# Patient Record
Sex: FEMALE | Race: BLACK OR AFRICAN AMERICAN | NOT HISPANIC OR LATINO | Employment: FULL TIME | URBAN - METROPOLITAN AREA
[De-identification: names, ages, dates, MRNs, and addresses within clinical notes are randomized per-mention and may not be internally consistent; named-entity substitution may affect disease eponyms.]

---

## 2014-03-24 LAB — HM PAP SMEAR: NEGATIVE

## 2017-08-02 ENCOUNTER — OFFICE VISIT (OUTPATIENT)
Dept: OBSTETRICS AND GYNECOLOGY | Facility: CLINIC | Age: 32
End: 2017-08-02

## 2017-08-02 VITALS
DIASTOLIC BLOOD PRESSURE: 60 MMHG | SYSTOLIC BLOOD PRESSURE: 94 MMHG | WEIGHT: 229 LBS | HEIGHT: 71 IN | BODY MASS INDEX: 32.06 KG/M2

## 2017-08-02 DIAGNOSIS — N94.6 DYSMENORRHEA: Primary | ICD-10-CM

## 2017-08-02 DIAGNOSIS — Z13.9 SCREENING: ICD-10-CM

## 2017-08-02 DIAGNOSIS — Z01.419 WELL FEMALE EXAM WITH ROUTINE GYNECOLOGICAL EXAM: ICD-10-CM

## 2017-08-02 LAB
BILIRUB BLD-MCNC: NEGATIVE MG/DL
CLARITY, POC: CLEAR
COLOR UR: YELLOW
GLUCOSE UR STRIP-MCNC: NEGATIVE MG/DL
KETONES UR QL: NEGATIVE
LEUKOCYTE EST, POC: ABNORMAL
NITRITE UR-MCNC: NEGATIVE MG/ML
PH UR: 7 [PH] (ref 5–8)
PROT UR STRIP-MCNC: NEGATIVE MG/DL
RBC # UR STRIP: ABNORMAL /UL
SP GR UR: 1.02 (ref 1–1.03)
UROBILINOGEN UR QL: NORMAL

## 2017-08-02 PROCEDURE — 99213 OFFICE O/P EST LOW 20 MIN: CPT | Performed by: OBSTETRICS & GYNECOLOGY

## 2017-08-02 RX ORDER — NORETHINDRONE ACETATE AND ETHINYL ESTRADIOL 1MG-20(21)
1 KIT ORAL DAILY
Qty: 28 TABLET | Refills: 12 | Status: SHIPPED | OUTPATIENT
Start: 2017-08-02 | End: 2018-08-02

## 2017-08-02 NOTE — PROGRESS NOTES
[facility]     2017      Patient:  Benita Jaquez   MR#:[id number]    Office note    CC: lower abdominal pain     Subjective     History of Present Illness  32 y.o. female  with complaint of intermittent moderate to severe lower abdominal pain crampy in nature for the last 6 months worse in the last 2 months.  GYN ultrasound reportedly completed at [facility] significant for fibroid and ovarian cysts.  Report is been requested      Patient Active Problem List   Diagnosis   • Well female exam with routine gynecological exam   • Dysmenorrhea       Past Medical History:   Diagnosis Date   • Abnormal Pap smear of cervix    • Asthma    • Diabetes mellitus     GESTATIONAL   • LGSIL on Pap smear of cervix 2005   • Trichomonas infection 2010       Past Surgical History:   Procedure Laterality Date   •  SECTION     •  SECTION     •  SECTION     •  SECTION     • DILATATION AND CURETTAGE  2008   • HYSTEROTOMY  2008    closure of hysterotomy   • MINI-LAPAROTOMY  2008   • OTHER SURGICAL HISTORY  2008    reduction of bowel hernia       Obstetric History:  OB History      Para Term  AB TAB SAB Ectopic Multiple Living    8 4 4  4  1   4         Menstrual History:     Patient's last menstrual period was 2017 (exact date).       #: 1, Date: 2005, Sex: Female, Weight: 6 lb 11 oz (3.033 kg), GA: 39w0d, Delivery: , Low Transverse, Apgar1: None, Apgar5: None, Living: Yes, Birth Comments: None    #: 2, Date: 2006, Sex: Male, Weight: 7 lb 12 oz (3.515 kg), GA: 39w0d, Delivery: , Low Transverse, Apgar1: None, Apgar5: None, Living: Yes, Birth Comments: None    #: 3, Date: 2008, Sex: None, Weight: None, GA: None, Delivery: None, Apgar1: None, Apgar5: None, Living: None, Birth Comments: None    #: 4, Date: 04/06/10, Sex: Male, Weight: 7 lb 10 oz (3.459 kg), GA:  39w0d, Delivery: , Low Transverse, Apgar1: 9, Apgar5: 9, Living: Yes, Birth Comments: None    #: 5, Date: 2010, Sex: None, Weight: None, GA: None, Delivery: None, Apgar1: None, Apgar5: None, Living: None, Birth Comments: None    #: 6, Date: 2010, Sex: None, Weight: None, GA: None, Delivery: None, Apgar1: None, Apgar5: None, Living: None, Birth Comments: None    #: 7, Date: 2011, Sex: None, Weight: None, GA: None, Delivery: None, Apgar1: None, Apgar5: None, Living: None, Birth Comments: None    #: 8, Date: 2015, Sex: Male, Weight: 8 lb 7 oz (3.827 kg), GA: 38w0d, Delivery: , Low Transverse, Apgar1: None, Apgar5: None, Living: Yes, Birth Comments: None      Family History   Problem Relation Age of Onset   • Breast cancer Maternal Grandmother 64   • Diabetes Maternal Grandmother    • Breast cancer Other 70   • Diabetes Maternal Aunt    • Thyroid cancer Maternal Aunt 57   • Diabetes Maternal Uncle        Social History   Substance Use Topics   • Smoking status: Current Some Day Smoker     Types: Cigarettes   • Smokeless tobacco: Never Used   • Alcohol use 0.6 oz/week     1 Glasses of wine per week       Review of patient's allergies indicates no known allergies.    No current outpatient prescriptions on file.    The following portions of the patient's history were reviewed and updated as appropriate: allergies, current medications, past family history, past medical history, past social history, past surgical history and problem list.    Review of Systems   Constitutional: Negative.    Respiratory: Negative.    Cardiovascular: Negative.    Gastrointestinal: Positive for abdominal pain.   Genitourinary: Positive for dyspareunia, menstrual problem and pelvic pain.   Psychiatric/Behavioral: Negative.        BP Readings from Last 3 Encounters:   17 94/60      Wt Readings from Last 3 Encounters:   17 229 lb (104 kg)      BMI: Estimated body mass index is 31.94 kg/(m^2) as calculated  "from the following:    Height as of this encounter: 71\" (180.3 cm).    Weight as of this encounter: 229 lb (104 kg).  BSA: Estimated body surface area is 2.23 meters squared as calculated from the following:    Height as of this encounter: 71\" (180.3 cm).    Weight as of this encounter: 229 lb (104 kg).    Objective   Physical Exam   Constitutional: She is oriented to person, place, and time. She appears well-developed and well-nourished.   Cardiovascular: Normal rate and regular rhythm.    Pulmonary/Chest: Effort normal and breath sounds normal.   Abdominal: Soft. Bowel sounds are normal. She exhibits no distension and no mass. There is no tenderness.   Genitourinary: Vagina normal and uterus normal.   Genitourinary Comments: Mobile uterus mildly tender on exam   Neurological: She is alert and oriented to person, place, and time.   Psychiatric: She has a normal mood and affect. Her behavior is normal. Judgment and thought content normal.   Nursing note and vitals reviewed.        Assessment/Plan     1.  Pelvic pain  2.  Dysmenorrhea  3.  History of  section ×4    Plan.  Trial of oral contraceptive pills for 3 months and reevaluate  Return in about 3 months (around 2017) for GYN follow-up.   Request PAP and cultures from [facility]         Khurram Cisneros MD   2017 10:02 AM  "

## 2017-08-04 ENCOUNTER — TELEPHONE (OUTPATIENT)
Dept: OBSTETRICS AND GYNECOLOGY | Facility: CLINIC | Age: 32
End: 2017-08-04

## 2017-08-04 PROBLEM — A59.01 TRICHOMONAL VAGINITIS: Status: ACTIVE | Noted: 2017-08-04

## 2017-08-04 LAB
C TRACH RRNA SPEC QL NAA+PROBE: NEGATIVE
N GONORRHOEA RRNA SPEC QL NAA+PROBE: NEGATIVE
T VAGINALIS RRNA SPEC QL NAA+PROBE: POSITIVE

## 2017-08-04 NOTE — PROGRESS NOTES
Call patient: STD screening returns positive for Trichomonas    Scheduled consult for treatment and instructions

## 2017-08-04 NOTE — TELEPHONE ENCOUNTER
----- Message from Khurram Cisneros MD sent at 8/4/2017  7:57 AM EDT -----  Call patient: STD screening returns positive for Trichomonas    Scheduled consult for treatment and instructions

## 2017-08-09 ENCOUNTER — OFFICE VISIT (OUTPATIENT)
Dept: OBSTETRICS AND GYNECOLOGY | Facility: CLINIC | Age: 32
End: 2017-08-09

## 2017-08-09 VITALS
WEIGHT: 229 LBS | DIASTOLIC BLOOD PRESSURE: 70 MMHG | HEIGHT: 71 IN | BODY MASS INDEX: 32.06 KG/M2 | SYSTOLIC BLOOD PRESSURE: 120 MMHG

## 2017-08-09 DIAGNOSIS — A59.01 TRICHOMONAL VAGINITIS: Primary | ICD-10-CM

## 2017-08-09 PROCEDURE — 99213 OFFICE O/P EST LOW 20 MIN: CPT | Performed by: OBSTETRICS & GYNECOLOGY

## 2017-08-09 RX ORDER — METRONIDAZOLE 500 MG/1
2000 TABLET ORAL WEEKLY
Qty: 8 TABLET | Refills: 0 | Status: SHIPPED | OUTPATIENT
Start: 2017-08-09 | End: 2017-08-17

## 2017-08-09 NOTE — PROGRESS NOTES
[facility]     2017      Patient:  Benita Jaquez   MR#:[id number]    Office note    CC: + trich    Subjective     History of Present Illness  32 y.o. female  returns for treatment of Trichomonas vaginalis.  The patient's asked multiple questions about routes of acquiring the infection.  Both members the couple report being monogamous.  Discussed transmission of disease.    Patient reports feeling well with minimal symptoms from the disease.  She's noticed only a very mild discharge with intermittent for the past 2 weeks with no odor    Patient Active Problem List   Diagnosis   • Well female exam with routine gynecological exam   • Dysmenorrhea   • Trichomonal vaginitis       Past Medical History:   Diagnosis Date   • Abnormal Pap smear of cervix    • Asthma    • Diabetes mellitus     GESTATIONAL   • LGSIL on Pap smear of cervix 2005   • Trichomonas infection 2010   • Urogenital trichomoniasis [id number]       Past Surgical History:   Procedure Laterality Date   •  SECTION     •  SECTION     •  SECTION     •  SECTION     • DILATATION AND CURETTAGE  2008   • HYSTEROTOMY  2008    closure of hysterotomy   • MINI-LAPAROTOMY  2008   • OTHER SURGICAL HISTORY  2008    reduction of bowel hernia       Obstetric History:  OB History      Para Term  AB TAB SAB Ectopic Multiple Living    8 4 4  4  1   4         Menstrual History:     Patient's last menstrual period was 2017 (exact date).       #: 1, Date: 2005, Sex: Female, Weight: 6 lb 11 oz (3.033 kg), GA: 39w0d, Delivery: , Low Transverse, Apgar1: None, Apgar5: None, Living: Yes, Birth Comments: None    #: 2, Date: 2006, Sex: Male, Weight: 7 lb 12 oz (3.515 kg), GA: 39w0d, Delivery: , Low Transverse, Apgar1: None, Apgar5: None, Living: Yes, Birth Comments: None    #: 3, Date: 2008, Sex: None, Weight: None, GA: None,  Delivery: None, Apgar1: None, Apgar5: None, Living: None, Birth Comments: None    #: 4, Date: 04/06/10, Sex: Male, Weight: 7 lb 10 oz (3.459 kg), GA: 39w0d, Delivery: , Low Transverse, Apgar1: 9, Apgar5: 9, Living: Yes, Birth Comments: None    #: 5, Date: 2010, Sex: None, Weight: None, GA: None, Delivery: None, Apgar1: None, Apgar5: None, Living: None, Birth Comments: None    #: 6, Date: 2010, Sex: None, Weight: None, GA: None, Delivery: None, Apgar1: None, Apgar5: None, Living: None, Birth Comments: None    #: 7, Date: 2011, Sex: None, Weight: None, GA: None, Delivery: None, Apgar1: None, Apgar5: None, Living: None, Birth Comments: None    #: 8, Date: 2015, Sex: Male, Weight: 8 lb 7 oz (3.827 kg), GA: 38w0d, Delivery: , Low Transverse, Apgar1: None, Apgar5: None, Living: Yes, Birth Comments: None      Family History   Problem Relation Age of Onset   • Breast cancer Maternal Grandmother 64   • Diabetes Maternal Grandmother    • Breast cancer Other 70   • Diabetes Maternal Aunt    • Thyroid cancer Maternal Aunt 57   • Diabetes Maternal Uncle        Social History   Substance Use Topics   • Smoking status: Current Some Day Smoker     Types: Cigarettes   • Smokeless tobacco: Never Used   • Alcohol use 0.6 oz/week     1 Glasses of wine per week       Review of patient's allergies indicates no known allergies.      Current Outpatient Prescriptions:   •  norethindrone-ethinyl estradiol FE (MICROGESTIN FE ) 1-20 MG-MCG per tablet, Take 1 tablet by mouth Daily., Disp: 28 tablet, Rfl: 12    The following portions of the patient's history were reviewed and updated as appropriate: allergies, current medications, past family history, past medical history, past social history, past surgical history and problem list.    Review of Systems   Constitutional: Negative.    Respiratory: Negative.    Cardiovascular: Negative.    Gastrointestinal: Negative.    Genitourinary: Positive for vaginal  "discharge.   Psychiatric/Behavioral: Negative.        BP Readings from Last 3 Encounters:   08/09/17 120/70   08/02/17 94/60      Wt Readings from Last 3 Encounters:   08/09/17 229 lb (104 kg)   08/02/17 229 lb (104 kg)      BMI: Estimated body mass index is 31.94 kg/(m^2) as calculated from the following:    Height as of this encounter: 71\" (180.3 cm).    Weight as of this encounter: 229 lb (104 kg).  BSA: Estimated body surface area is 2.23 meters squared as calculated from the following:    Height as of this encounter: 71\" (180.3 cm).    Weight as of this encounter: 229 lb (104 kg).    Objective   Physical Exam   Constitutional: She is oriented to person, place, and time. She appears well-developed and well-nourished.   Cardiovascular: Normal rate and regular rhythm.    Pulmonary/Chest: Effort normal and breath sounds normal.   Abdominal: Soft. Bowel sounds are normal. She exhibits no distension and no mass. There is no tenderness.   Neurological: She is alert and oriented to person, place, and time.   Psychiatric: She has a normal mood and affect. Her behavior is normal. Judgment and thought content normal.   Nursing note and vitals reviewed.        Assessment/Plan     1.  Trichomonas vaginalis-prescription provided for patient's  Hermes Rausch and prescription sent to the patient's pharmacy    Detailed instructions provided regarding taking the medication and abstaining from sex for 2 weeks    Return in about 6 weeks (around 9/20/2017) for DAYO Trich .        Khurram Cisneros MD   8/9/2017 8:15 AM  "

## 2017-09-22 ENCOUNTER — OFFICE VISIT (OUTPATIENT)
Dept: OBSTETRICS AND GYNECOLOGY | Facility: CLINIC | Age: 32
End: 2017-09-22

## 2017-09-22 VITALS
BODY MASS INDEX: 32.48 KG/M2 | SYSTOLIC BLOOD PRESSURE: 118 MMHG | WEIGHT: 232 LBS | HEIGHT: 71 IN | DIASTOLIC BLOOD PRESSURE: 80 MMHG

## 2017-09-22 DIAGNOSIS — A59.01 TRICHOMONAL VAGINITIS: Primary | ICD-10-CM

## 2017-09-22 PROCEDURE — 99212 OFFICE O/P EST SF 10 MIN: CPT | Performed by: OBSTETRICS & GYNECOLOGY

## 2017-09-22 NOTE — PROGRESS NOTES
[facility]     2017      Patient:  Benita Jaquez   MR#:[id number]    Office note    CC: Trich DAYO    Subjective     History of Present Illness  32 y.o. female  the patient returns feeling well without any complaints for Trichomonas test of cure.  The patient reports no symptoms or vaginal discharge and reports having completed previous treatment regimen as did her partner.      Patient Active Problem List   Diagnosis   • Well female exam with routine gynecological exam   • Dysmenorrhea   • Trichomonal vaginitis       Past Medical History:   Diagnosis Date   • Abnormal Pap smear of cervix    • Asthma    • Diabetes mellitus     GESTATIONAL   • LGSIL on Pap smear of cervix 2005   • Trichomonas infection 2010   • Urogenital trichomoniasis [id number]       Past Surgical History:   Procedure Laterality Date   •  SECTION     •  SECTION     •  SECTION     •  SECTION     • DILATATION AND CURETTAGE  2008   • HYSTEROTOMY  2008    closure of hysterotomy   • MINI-LAPAROTOMY  2008   • OTHER SURGICAL HISTORY  2008    reduction of bowel hernia       Obstetric History:  OB History      Para Term  AB Living    8 4 4  4 4    SAB TAB Ectopic Multiple Live Births    1    4         Menstrual History:     Patient's last menstrual period was 2017.       #: 1, Date: 2005, Sex: Female, Weight: 6 lb 11 oz (3.033 kg), GA: 39w0d, Delivery: , Low Transverse, Apgar1: None, Apgar5: None, Living: Living, Birth Comments: None    #: 2, Date: 2006, Sex: Male, Weight: 7 lb 12 oz (3.515 kg), GA: 39w0d, Delivery: , Low Transverse, Apgar1: None, Apgar5: None, Living: Living, Birth Comments: None    #: 3, Date: 2008, Sex: None, Weight: None, GA: None, Delivery: None, Apgar1: None, Apgar5: None, Living: None, Birth Comments: None    #: 4, Date: 04/06/10, Sex: Male, Weight: 7 lb 10 oz (3.459 kg),  GA: 39w0d, Delivery: , Low Transverse, Apgar1: 9, Apgar5: 9, Living: Living, Birth Comments: None    #: 5, Date: 2010, Sex: None, Weight: None, GA: None, Delivery: None, Apgar1: None, Apgar5: None, Living: None, Birth Comments: None    #: 6, Date: 2010, Sex: None, Weight: None, GA: None, Delivery: None, Apgar1: None, Apgar5: None, Living: None, Birth Comments: None    #: 7, Date: 2011, Sex: None, Weight: None, GA: None, Delivery: None, Apgar1: None, Apgar5: None, Living: None, Birth Comments: None    #: 8, Date: 2015, Sex: Male, Weight: 8 lb 7 oz (3.827 kg), GA: 38w0d, Delivery: , Low Transverse, Apgar1: None, Apgar5: None, Living: Living, Birth Comments: None      Family History   Problem Relation Age of Onset   • Breast cancer Maternal Grandmother 64   • Diabetes Maternal Grandmother    • Breast cancer Other 70   • Diabetes Maternal Aunt    • Thyroid cancer Maternal Aunt 57   • Diabetes Maternal Uncle        Social History   Substance Use Topics   • Smoking status: Current Some Day Smoker     Types: Cigarettes   • Smokeless tobacco: Never Used   • Alcohol use 0.6 oz/week     1 Glasses of wine per week       Review of patient's allergies indicates no known allergies.      Current Outpatient Prescriptions:   •  norethindrone-ethinyl estradiol FE (MICROGESTIN FE ) 1-20 MG-MCG per tablet, Take 1 tablet by mouth Daily., Disp: 28 tablet, Rfl: 12    The following portions of the patient's history were reviewed and updated as appropriate: allergies, current medications, past family history, past medical history, past social history, past surgical history and problem list.    Review of Systems   Constitutional: Negative.    Respiratory: Negative.    Cardiovascular: Negative.    Gastrointestinal: Negative.    Genitourinary: Negative.  Negative for vaginal discharge.   Psychiatric/Behavioral: Negative.        BP Readings from Last 3 Encounters:   17 118/80   17 120/70   17  "94/60      Wt Readings from Last 3 Encounters:   09/22/17 232 lb (105 kg)   08/09/17 229 lb (104 kg)   08/02/17 229 lb (104 kg)      BMI: Estimated body mass index is 32.36 kg/(m^2) as calculated from the following:    Height as of this encounter: 71\" (180.3 cm).    Weight as of this encounter: 232 lb (105 kg).  BSA: Estimated body surface area is 2.24 meters squared as calculated from the following:    Height as of this encounter: 71\" (180.3 cm).    Weight as of this encounter: 232 lb (105 kg).    Objective   Physical Exam   Constitutional: She is oriented to person, place, and time. She appears well-developed and well-nourished.   Abdominal: Soft. Bowel sounds are normal. She exhibits no distension and no mass. There is no tenderness.   Genitourinary: Vagina normal and uterus normal.   Genitourinary Comments: No odor     Neurological: She is alert and oriented to person, place, and time.   Psychiatric: She has a normal mood and affect. Her behavior is normal. Judgment and thought content normal.   Nursing note and vitals reviewed.        Assessment/Plan     Benita was seen today for follow-up.    Diagnoses and all orders for this visit:    Trichomonal vaginitis  -     Chlamydia trachomatis, Neisseria gonorrhoeae, Trichomonas vaginalis, PCR          No Follow-up on file.        Khurram Cisneros MD   9/22/2017 11:58 AM  "

## 2017-09-27 ENCOUNTER — OFFICE VISIT (OUTPATIENT)
Dept: OBSTETRICS AND GYNECOLOGY | Facility: CLINIC | Age: 32
End: 2017-09-27

## 2017-09-27 ENCOUNTER — TELEPHONE (OUTPATIENT)
Dept: OBSTETRICS AND GYNECOLOGY | Facility: CLINIC | Age: 32
End: 2017-09-27

## 2017-09-27 DIAGNOSIS — A59.01 TRICHOMONAL VAGINITIS: Primary | ICD-10-CM

## 2017-09-27 PROCEDURE — 99213 OFFICE O/P EST LOW 20 MIN: CPT | Performed by: NURSE PRACTITIONER

## 2017-09-27 RX ORDER — METRONIDAZOLE 500 MG/1
TABLET ORAL
Qty: 4 TABLET | Refills: 0 | Status: SHIPPED | OUTPATIENT
Start: 2017-09-27

## 2017-09-27 NOTE — TELEPHONE ENCOUNTER
Do you want to treat Benita since this was a test of cure or ?  Should she see Dionna . Your not here until 2 wks

## 2017-09-27 NOTE — TELEPHONE ENCOUNTER
----- Message from Khurram Cisneros MD sent at 9/26/2017  4:36 PM EDT -----  Call patient: STD screening returns positive for Trichomonas    Scheduled consult for treatment and instructions

## 2017-09-27 NOTE — PROGRESS NOTES
Subjective   Benita Jaquez is a 32 y.o. female presents for f/u trichomoniasis.     History of Present Illness    Benita was found to have trichomoniasis early August. She reported back to office for counseling and both she and partner were treated. Benita reports both completed the flagyl therapy. She presented for DAYO last week. Trich again positive. Here today for further counseling and treatment. She is asymptomatic. She reports she and her partner had IC x 1 but did use a condom.    The following portions of the patient's history were reviewed and updated as appropriate: allergies, current medications, past family history, past medical history, past social history, past surgical history and problem list.    Review of Systems   Constitutional: Negative for chills, fatigue and fever.   Gastrointestinal: Negative for abdominal distention and abdominal pain.   Genitourinary: Negative for difficulty urinating, dyspareunia, dysuria, frequency, genital sores, menstrual problem, pelvic pain, vaginal bleeding, vaginal discharge and vaginal pain.       Objective   Physical Exam    No exam today     Assessment/Plan   Benita was seen today for gynecologic exam.    Diagnoses and all orders for this visit:    Trichomonal vaginitis    Other orders  -     metroNIDAZOLE (FLAGYL) 500 MG tablet; Take 4 tablets now. Avoid alcohol during and 24 hours following therapy.      Another round of flagyl sent to pharmacy. Advised Benita's  to be evaluated at [facility] department and treated again. Highly encouraged her to abstain from IC until both she and partner have negative test of cures. She verbalizes understanding and agrees with plan.    HEIKE Giordano

## 2022-08-03 ENCOUNTER — HOSPITAL ENCOUNTER (EMERGENCY)
Facility: HOSPITAL | Age: 37
Discharge: HOME | End: 2022-08-03
Attending: EMERGENCY MEDICINE | Admitting: EMERGENCY MEDICINE

## 2022-08-03 ENCOUNTER — APPOINTMENT (OUTPATIENT)
Dept: ULTRASOUND IMAGING | Facility: HOSPITAL | Age: 37
End: 2022-08-03

## 2022-08-03 VITALS
OXYGEN SATURATION: 100 % | RESPIRATION RATE: 18 BRPM | TEMPERATURE: 97.8 F | SYSTOLIC BLOOD PRESSURE: 121 MMHG | HEART RATE: 60 BPM | DIASTOLIC BLOOD PRESSURE: 81 MMHG

## 2022-08-03 DIAGNOSIS — N93.8 DYSFUNCTIONAL UTERINE BLEEDING: Primary | ICD-10-CM

## 2022-08-03 LAB
ALBUMIN SERPL-MCNC: 4.3 G/DL (ref 3.5–5.2)
ALBUMIN/GLOB SERPL: 1.5 G/DL
ALP SERPL-CCNC: 50 U/L (ref 39–117)
ALT SERPL W P-5'-P-CCNC: 12 U/L (ref 1–33)
ANION GAP SERPL CALCULATED.3IONS-SCNC: 12.1 MMOL/L (ref 5–15)
AST SERPL-CCNC: 12 U/L (ref 1–32)
BACTERIA UR QL AUTO: ABNORMAL /HPF
BASOPHILS # BLD AUTO: 0.04 10*3/MM3 (ref 0–0.2)
BASOPHILS NFR BLD AUTO: 0.8 % (ref 0–1.5)
BILIRUB SERPL-MCNC: 0.2 MG/DL (ref 0–1.2)
BILIRUB UR QL STRIP: NEGATIVE
BUN SERPL-MCNC: 8 MG/DL (ref 6–20)
BUN/CREAT SERPL: 9.6 (ref 7–25)
CALCIUM SPEC-SCNC: 8.9 MG/DL (ref 8.6–10.5)
CHLORIDE SERPL-SCNC: 104 MMOL/L (ref 98–107)
CLARITY UR: ABNORMAL
CLUE CELLS SPEC QL WET PREP: NORMAL
CO2 SERPL-SCNC: 22.9 MMOL/L (ref 22–29)
COLOR UR: ABNORMAL
CREAT SERPL-MCNC: 0.83 MG/DL (ref 0.57–1)
DEPRECATED RDW RBC AUTO: 42.3 FL (ref 37–54)
EGFRCR SERPLBLD CKD-EPI 2021: 92.7 ML/MIN/1.73
EOSINOPHIL # BLD AUTO: 0.16 10*3/MM3 (ref 0–0.4)
EOSINOPHIL NFR BLD AUTO: 3.1 % (ref 0.3–6.2)
ERYTHROCYTE [DISTWIDTH] IN BLOOD BY AUTOMATED COUNT: 13.2 % (ref 12.3–15.4)
GLOBULIN UR ELPH-MCNC: 2.9 GM/DL
GLUCOSE SERPL-MCNC: 91 MG/DL (ref 65–99)
GLUCOSE UR STRIP-MCNC: NEGATIVE MG/DL
HCG SERPL QL: NEGATIVE
HCT VFR BLD AUTO: 36.4 % (ref 34–46.6)
HGB BLD-MCNC: 11.9 G/DL (ref 12–15.9)
HGB UR QL STRIP.AUTO: ABNORMAL
HYALINE CASTS UR QL AUTO: ABNORMAL /LPF
HYDATID CYST SPEC WET PREP: NORMAL
IMM GRANULOCYTES # BLD AUTO: 0.01 10*3/MM3 (ref 0–0.05)
IMM GRANULOCYTES NFR BLD AUTO: 0.2 % (ref 0–0.5)
KETONES UR QL STRIP: NEGATIVE
KOH PREP NAIL: NORMAL
LEUKOCYTE ESTERASE UR QL STRIP.AUTO: ABNORMAL
LYMPHOCYTES # BLD AUTO: 1.55 10*3/MM3 (ref 0.7–3.1)
LYMPHOCYTES NFR BLD AUTO: 30.2 % (ref 19.6–45.3)
MCH RBC QN AUTO: 28.5 PG (ref 26.6–33)
MCHC RBC AUTO-ENTMCNC: 32.7 G/DL (ref 31.5–35.7)
MCV RBC AUTO: 87.3 FL (ref 79–97)
MONOCYTES # BLD AUTO: 0.47 10*3/MM3 (ref 0.1–0.9)
MONOCYTES NFR BLD AUTO: 9.1 % (ref 5–12)
NEUTROPHILS NFR BLD AUTO: 2.91 10*3/MM3 (ref 1.7–7)
NEUTROPHILS NFR BLD AUTO: 56.6 % (ref 42.7–76)
NITRITE UR QL STRIP: NEGATIVE
NRBC BLD AUTO-RTO: 0 /100 WBC (ref 0–0.2)
PH UR STRIP.AUTO: 6 [PH] (ref 5–8)
PLATELET # BLD AUTO: 283 10*3/MM3 (ref 140–450)
PMV BLD AUTO: 10.4 FL (ref 6–12)
POTASSIUM SERPL-SCNC: 3.7 MMOL/L (ref 3.5–5.2)
PROT SERPL-MCNC: 7.2 G/DL (ref 6–8.5)
PROT UR QL STRIP: ABNORMAL
RBC # BLD AUTO: 4.17 10*6/MM3 (ref 3.77–5.28)
RBC # UR STRIP: ABNORMAL /HPF
REF LAB TEST METHOD: ABNORMAL
SODIUM SERPL-SCNC: 139 MMOL/L (ref 136–145)
SP GR UR STRIP: 1.01 (ref 1–1.03)
SQUAMOUS #/AREA URNS HPF: ABNORMAL /HPF
T VAGINALIS SPEC QL WET PREP: NORMAL
UROBILINOGEN UR QL STRIP: ABNORMAL
WBC # UR STRIP: ABNORMAL /HPF
WBC NRBC COR # BLD: 5.14 10*3/MM3 (ref 3.4–10.8)
WBC SPEC QL WET PREP: NORMAL
YEAST GENITAL QL WET PREP: NORMAL

## 2022-08-03 PROCEDURE — 76856 US EXAM PELVIC COMPLETE: CPT

## 2022-08-03 PROCEDURE — 87591 N.GONORRHOEAE DNA AMP PROB: CPT | Performed by: NURSE PRACTITIONER

## 2022-08-03 PROCEDURE — 76830 TRANSVAGINAL US NON-OB: CPT

## 2022-08-03 PROCEDURE — 87210 SMEAR WET MOUNT SALINE/INK: CPT | Performed by: NURSE PRACTITIONER

## 2022-08-03 PROCEDURE — 80053 COMPREHEN METABOLIC PANEL: CPT | Performed by: NURSE PRACTITIONER

## 2022-08-03 PROCEDURE — 87220 TISSUE EXAM FOR FUNGI: CPT | Performed by: NURSE PRACTITIONER

## 2022-08-03 PROCEDURE — 87491 CHLMYD TRACH DNA AMP PROBE: CPT | Performed by: NURSE PRACTITIONER

## 2022-08-03 PROCEDURE — 85025 COMPLETE CBC W/AUTO DIFF WBC: CPT | Performed by: NURSE PRACTITIONER

## 2022-08-03 PROCEDURE — 81001 URINALYSIS AUTO W/SCOPE: CPT | Performed by: NURSE PRACTITIONER

## 2022-08-03 PROCEDURE — 84703 CHORIONIC GONADOTROPIN ASSAY: CPT | Performed by: NURSE PRACTITIONER

## 2022-08-03 PROCEDURE — 99284 EMERGENCY DEPT VISIT MOD MDM: CPT

## 2022-08-03 RX ORDER — ACETAMINOPHEN 500 MG
1000 TABLET ORAL ONCE
Status: COMPLETED | OUTPATIENT
Start: 2022-08-03 | End: 2022-08-03

## 2022-08-03 RX ADMIN — ACETAMINOPHEN 1000 MG: 500 TABLET, FILM COATED ORAL at 13:35

## 2022-08-03 NOTE — ED PROVIDER NOTES
The ROULA and I have discussed this patient's history, physical exam and treatment plan.  I provided a substantive portion of the care of this patient.  I have reviewed the documentation and personally had a face to face interaction with the patient and personally performed the physical exam, in its entirety.  I affirm the documentation and agree with the treatment and plan.  The following describes my personal findings.      The patient presents complaining of vaginal bleeding over the past month.  Patient reports she been bleeding every day.  Patient reports she is bled through approximately 7 pads in the past 24 hours, reports lower abdominal cramping without nausea/vomiting, passing out.  Patient does report she has occasional lightheadedness.  Patient reports she plans to see Dr. Luis E Cisneros for recheck, further testing, treatment as needed.    Comprehensive Physical exam:  Patient is nontoxic appearing oriented, conversant awake, alert  HEENT: normocephalic, atraumatic  Neck: No JVD no goiter, no pain with ROM  Pulmonary: Nontachypneic, breath sounds heard well bilaterally c  cardiovascular: Nontachycardic, bilateral radial pulses palpable  Abdomen: Soft, mild tenderness to palpation suprapubic without guarding or rebound   musculoskeletal: good range of motion, pulse, sensation x4  Neuro/psychiatric:calm, appropriate, cooperative  Skin:warm, dry    Patient voices understanding of need to follow with gynecology for recheck, further testing, treatment as needed.  And to return to the ER immediately with bleeding greater than 1 pad per hour, lightheadedness or passing out    Patient was wearing facemask when I entered the room and throughout our encounter. Full protective equipment was worn throughout this patient encounter including a face mask, eye protection and gloves. Hand hygiene was performed before donning protective equipment and after removal when leaving the room.           Talisha Jurado MD  08/03/22  2013

## 2022-08-03 NOTE — ED PROVIDER NOTES
"EMERGENCY DEPARTMENT ENCOUNTER    Room Number:  26/26  Date seen:  8/3/2022  Time seen: 11:15 EDT  PCP: Caitlyn Gaviria APRN  Historian: Patient, recent PCP office visit notes    HPI:  Chief complaint: Vaginal bleeding  A complete HPI/ROS/PMH/PSH/SH/FH are unobtainable due to: Not applicable  Context:Benita Rausch is a 37 y.o. female G5, P4 with history of bilateral tubal ligation who presents to the ED with c/o heavy vaginal bleeding that comes and goes since July 8.  It is not made better or worse by anything.  She reports several episodes of bleeding through her pad and panties at work.  Recently the past several days she has been using about 2 pads per day.  She does have some lower abdominal discomfort that is vague and \"just does not feel right\".  She also mentions some episodic lightheadedness but denies any chest pain or shortness of breath.  She saw her primary care yesterday who diagnosed her with UTI and check labs and she called her previous OB/GYN today who recommended she come to the ER for evaluation.    Patient was placed in face mask in first look. Patient was wearing facemask when I entered the room and throughout our encounter. I wore full protective equipment throughout this patient encounter including a N95 face mask, eye shield and gloves. Hand hygiene/washing of hands was performed before donning protective equipment and after removal when leaving the room.    MEDICAL RECORD REVIEW  Patient did see primary care provider yesterday had labs collected and was diagnosed with urinary tract infection.  Patient reports to me that she started Bactrim and has had 1 dose this morning.    ALLERGIES  Patient has no known allergies.    PAST MEDICAL HISTORY  Active Ambulatory Problems     Diagnosis Date Noted   • Well female exam with routine gynecological exam 08/02/2017   • Dysmenorrhea 08/02/2017   • Trichomonal vaginitis 08/04/2017     Resolved Ambulatory Problems     Diagnosis Date Noted "   • No Resolved Ambulatory Problems     Past Medical History:   Diagnosis Date   • Abnormal Pap smear of cervix    • Asthma    • Diabetes mellitus (CMS/[facility])    • LGSIL on Pap smear of cervix 2005   • Trichomonas infection 2010   • Urogenital trichomoniasis [id number]       PAST SURGICAL HISTORY  Past Surgical History:   Procedure Laterality Date   •  SECTION     •  SECTION     •  SECTION     •  SECTION     • DILATATION AND CURETTAGE  2008   • HYSTEROTOMY  2008    closure of hysterotomy   • MINI-LAPAROTOMY  2008   • OTHER SURGICAL HISTORY  2008    reduction of bowel hernia       FAMILY HISTORY  Family History   Problem Relation Age of Onset   • Breast cancer Maternal Grandmother 64   • Diabetes Maternal Grandmother    • Breast cancer Other 70   • Diabetes Maternal Aunt    • Thyroid cancer Maternal Aunt 57   • Diabetes Maternal Uncle        SOCIAL HISTORY  Social History     Socioeconomic History   • Marital status:      Spouse name: Hermes Rausch   • Number of children: 4   • Years of education: 12   Tobacco Use   • Smoking status: Current Some Day Smoker     Types: Cigarettes   • Smokeless tobacco: Never Used   Substance and Sexual Activity   • Alcohol use: Yes     Alcohol/week: 1.0 standard drink     Types: 1 Glasses of wine per week   • Drug use: Yes     Types: Marijuana   • Sexual activity: Yes     Partners: Male     Birth control/protection: Surgical     Comment: tubal       REVIEW OF SYSTEMS  Review of Systems    All systems reviewed and negative except for those discussed in HPI.     PHYSICAL EXAM    ED Triage Vitals [22 1101]   Temp Heart Rate Resp BP SpO2   97.8 °F (36.6 °C) 83 18 -- 100 %      Temp src Heart Rate Source Patient Position BP Location FiO2 (%)   Tympanic Monitor -- -- --     Physical Exam  Exam conducted with a chaperone present.   Genitourinary:     Exam position: Supine.      Vagina: Bleeding  (Moderate) present.      Cervix: Normal.      Adnexa: Right adnexa normal and left adnexa normal.         I have reviewed the triage vital signs and nursing notes.      GENERAL: not distressed  HENT: nares patent, mm moist  EYES: no scleral icterus  NECK: no ROM limitations  CV: regular rhythm, regular rate, no murmur  RESPIRATORY: normal effort, CTAB  ABDOMEN: soft, no focal tenderness  : see above  MUSCULOSKELETAL: no deformity  NEURO: alert, moves all extremities, follows commands  SKIN: warm, dry    LAB RESULTS  Recent Results (from the past 24 hour(s))   Comprehensive Metabolic Panel    Collection Time: 08/03/22 11:30 AM    Specimen: Blood   Result Value Ref Range    Glucose 91 65 - 99 mg/dL    BUN 8 6 - 20 mg/dL    Creatinine 0.83 0.57 - 1.00 mg/dL    Sodium 139 136 - 145 mmol/L    Potassium 3.7 3.5 - 5.2 mmol/L    Chloride 104 98 - 107 mmol/L    CO2 22.9 22.0 - 29.0 mmol/L    Calcium 8.9 8.6 - 10.5 mg/dL    Total Protein 7.2 6.0 - 8.5 g/dL    Albumin 4.30 3.50 - 5.20 g/dL    ALT (SGPT) 12 1 - 33 U/L    AST (SGOT) 12 1 - 32 U/L    Alkaline Phosphatase 50 39 - 117 U/L    Total Bilirubin 0.2 0.0 - 1.2 mg/dL    Globulin 2.9 gm/dL    A/G Ratio 1.5 g/dL    BUN/Creatinine Ratio 9.6 7.0 - 25.0    Anion Gap 12.1 5.0 - 15.0 mmol/L    eGFR 92.7 >60.0 mL/min/1.73   hCG, Serum, Qualitative    Collection Time: 08/03/22 11:30 AM    Specimen: Blood   Result Value Ref Range    HCG Qualitative Negative Negative   CBC Auto Differential    Collection Time: 08/03/22 11:30 AM    Specimen: Blood   Result Value Ref Range    WBC 5.14 3.40 - 10.80 10*3/mm3    RBC 4.17 3.77 - 5.28 10*6/mm3    Hemoglobin 11.9 (L) 12.0 - 15.9 g/dL    Hematocrit 36.4 34.0 - 46.6 %    MCV 87.3 79.0 - 97.0 fL    MCH 28.5 26.6 - 33.0 pg    MCHC 32.7 31.5 - 35.7 g/dL    RDW 13.2 12.3 - 15.4 %    RDW-SD 42.3 37.0 - 54.0 fl    MPV 10.4 6.0 - 12.0 fL    Platelets 283 140 - 450 10*3/mm3    Neutrophil % 56.6 42.7 - 76.0 %    Lymphocyte % 30.2 19.6 - 45.3 %     Monocyte % 9.1 5.0 - 12.0 %    Eosinophil % 3.1 0.3 - 6.2 %    Basophil % 0.8 0.0 - 1.5 %    Immature Grans % 0.2 0.0 - 0.5 %    Neutrophils, Absolute 2.91 1.70 - 7.00 10*3/mm3    Lymphocytes, Absolute 1.55 0.70 - 3.10 10*3/mm3    Monocytes, Absolute 0.47 0.10 - 0.90 10*3/mm3    Eosinophils, Absolute 0.16 0.00 - 0.40 10*3/mm3    Basophils, Absolute 0.04 0.00 - 0.20 10*3/mm3    Immature Grans, Absolute 0.01 0.00 - 0.05 10*3/mm3    nRBC 0.0 0.0 - 0.2 /100 WBC   Urinalysis With Culture If Indicated - Urine, Clean Catch    Collection Time: 08/03/22 11:31 AM    Specimen: Urine, Clean Catch   Result Value Ref Range    Color, UA Red (A) Yellow, Straw    Appearance, UA Turbid (A) Clear    pH, UA 6.0 5.0 - 8.0    Specific Gravity, UA 1.010 1.005 - 1.030    Glucose, UA Negative Negative    Ketones, UA Negative Negative    Bilirubin, UA Negative Negative    Blood, UA Large (3+) (A) Negative    Protein,  mg/dL (2+) (A) Negative    Leuk Esterase, UA Small (1+) (A) Negative    Nitrite, UA Negative Negative    Urobilinogen, UA 0.2 E.U./dL 0.2 - 1.0 E.U./dL   Urinalysis, Microscopic Only - Urine, Clean Catch    Collection Time: 08/03/22 11:31 AM    Specimen: Urine, Clean Catch   Result Value Ref Range    RBC, UA Too Numerous to Count (A) None Seen, 0-2 /HPF    WBC, UA 0-2 None Seen, 0-2 /HPF    Bacteria, UA None Seen None Seen /HPF    Squamous Epithelial Cells, UA 0-2 None Seen, 0-2 /HPF    Hyaline Casts, UA None Seen None Seen /LPF    Methodology Manual Light Microscopy    PAMELA Prep - Swab, Vagina    Collection Time: 08/03/22 11:53 AM    Specimen: Vagina; Swab   Result Value Ref Range    KOH Prep No yeast or hyphal elements seen No yeast or hyphal elements seen   Wet Prep, Genital - Swab, Vagina    Collection Time: 08/03/22 11:53 AM    Specimen: Vagina; Swab   Result Value Ref Range    YEAST No yeast seen No yeast seen    HYPHAL ELEMENTS No Hyphal elements seen No Hyphal elements seen    WBC'S No WBC's seen No WBC's seen     Clue Cells, Wet Prep No Clue cells seen No Clue cells seen    Trichomonas, Wet Prep No Trichomonas seen No Trichomonas seen         RADIOLOGY RESULTS  US Pelvis Complete    Result Date: 8/3/2022  TRANSABDOMINAL AND TRANSVAGINAL PELVIC SONOGRAM WITH OVARIAN DOPPLER EVALUATION.  HISTORY: Vaginal bleeding. Prior tubal ligation and reportedly negative pregnancy test today.  TECHNIQUE: Transabdominal and transvaginal pelvic sonogram is provided.  FINDINGS: Uterus is anteflexed and measures 11.7 x 4.5 x 5.3 cm. The ovaries appear normal bilaterally. The right ovary measures 3.3 x 1.3 x 2.0 cm and left ovary measures 3.4 x 1.4 x 1.6 cm. Both ovaries demonstrate normal venous and arterial blood flow on Doppler interrogation.  There is a small volume of fluid in cervical canal. The endometrium measures 4 mm thick. No endometrial lesion identified. Small volume of free fluid in the pelvis.      Negative pelvic sonogram.  This report was finalized on 8/3/2022 1:26 PM by Dr. Dave Rogers M.D.           PROGRESS, DATA ANALYSIS, CONSULTS AND MEDICAL DECISION MAKING  All labs have been independently reviewed by me.  All radiology studies have been reviewed by me and discussed with radiologist dictating the report.  EKG's independently viewed and interpreted by me unless stated otherwise. Discussion below represents my analysis of pertinent findings related to patient's condition, differential diagnosis, treatment plan and final disposition.     ED Course as of 08/03/22 1400   Wed Aug 03, 2022   1154 Speculum exam and swabs collected.  See physical exam.  I have updated the patient on the plan to check labs and get non-OB pelvic ultrasound. [EW]   1357 MDM/dispo:  Pelvic exam labs are unremarkable.  She is in monogamous marriage.  Hemoglobin stable and minimally unchanged from one day ago.  She is not tachycardic or hypoxic.  I will have her follow up with Dr. Khurram Cisneros.   [EW]      ED Course User Index  [EW] Fidelia Arellano,  HEIKE     DDX: dysfunctional uterine bleeding, STI, abnormal menses     Reviewed pt's history and workup with Dr. Jurado.  After a bedside evaluation, Dr. Jurado agrees with the plan of care.    The patient's history, physical exam, and lab findings were discussed with the physician, who also performed a face to face history and physical exam.  I discussed all results and noted any abnormalities with patient.  Discussed absoute need to recheck abnormalities with their family physician.  I answered any of the patient's questions.  Discussed plan for discharge, as there is no emergent indication for admission.  Pt is agreeable and understands need for follow up and repeat testing.  Pt is aware that discharge does not mean that nothing is wrong but it indicates no emergency is present and they must continue care with their family physician.  Pt is discharged with instructions to follow up with primary care doctor to have their blood pressure rechecked.     Disposition vitals:  /69   Pulse 83   Temp 97.8 °F (36.6 °C) (Tympanic)   Resp 18   LMP 07/08/2022 (Exact Date)   SpO2 99%       DIAGNOSIS  Final diagnoses:   Dysfunctional uterine bleeding       FOLLOW UP   Khurram Cisneros MD  [address] [facility] [id number] [phone]    Schedule an appointment as soon as possible for a visit            Fidelia Arellano APRN  08/03/22 [id number]

## 2022-08-03 NOTE — DISCHARGE INSTRUCTIONS
Finish antibiotics for UTI    Always wipe front to back  Wear cotton panties  Wear no panties to bed  Urinate after intercourse    Return Precautions    Although you are being discharged from the ED today, I encourage you to return for worsening symptoms.  Things can, and do, change such that treatment at home with medication may not be adequate.      Specifically, return for any of the following:    Chest pain, shortness of breath, pain or nausea and vomiting not controlled by medications provided.    Please make a follow up with your Primary Care Provider for a blood pressure recheck.

## 2022-08-04 LAB
C TRACH RRNA SPEC QL NAA+PROBE: NEGATIVE
N GONORRHOEA RRNA SPEC QL NAA+PROBE: NEGATIVE

## 2024-08-07 ENCOUNTER — TRANSCRIBE ORDERS (OUTPATIENT)
Dept: ADMINISTRATIVE | Facility: HOSPITAL | Age: 39
End: 2024-08-07
Payer: COMMERCIAL

## 2024-08-07 DIAGNOSIS — Z12.2 SCREENING FOR LUNG CANCER: Primary | ICD-10-CM
